# Patient Record
Sex: FEMALE | Race: WHITE | NOT HISPANIC OR LATINO | Employment: OTHER | URBAN - NONMETROPOLITAN AREA
[De-identification: names, ages, dates, MRNs, and addresses within clinical notes are randomized per-mention and may not be internally consistent; named-entity substitution may affect disease eponyms.]

---

## 2017-07-06 ENCOUNTER — OFFICE VISIT (OUTPATIENT)
Dept: CARDIAC SURGERY | Facility: CLINIC | Age: 62
End: 2017-07-06

## 2017-07-06 DIAGNOSIS — R91.1 LUNG NODULE: Primary | ICD-10-CM

## 2017-07-06 PROCEDURE — 99205 OFFICE O/P NEW HI 60 MIN: CPT | Performed by: THORACIC SURGERY (CARDIOTHORACIC VASCULAR SURGERY)

## 2017-07-07 VITALS
WEIGHT: 108 LBS | DIASTOLIC BLOOD PRESSURE: 72 MMHG | SYSTOLIC BLOOD PRESSURE: 164 MMHG | BODY MASS INDEX: 19.14 KG/M2 | HEIGHT: 63 IN

## 2017-07-07 RX ORDER — LEVOCETIRIZINE DIHYDROCHLORIDE 5 MG/1
5 TABLET, FILM COATED ORAL EVERY EVENING
COMMUNITY

## 2017-07-07 RX ORDER — PNEUMOCOCCAL 13-VALENT CONJUGATE VACCINE 2.2; 2.2; 2.2; 2.2; 2.2; 4.4; 2.2; 2.2; 2.2; 2.2; 2.2; 2.2; 2.2 UG/.5ML; UG/.5ML; UG/.5ML; UG/.5ML; UG/.5ML; UG/.5ML; UG/.5ML; UG/.5ML; UG/.5ML; UG/.5ML; UG/.5ML; UG/.5ML; UG/.5ML
INJECTION, SUSPENSION INTRAMUSCULAR
Refills: 0 | COMMUNITY
Start: 2017-05-03

## 2017-07-07 RX ORDER — ALBUTEROL SULFATE 2.5 MG/3ML
2.5 SOLUTION RESPIRATORY (INHALATION) EVERY 4 HOURS PRN
COMMUNITY

## 2017-07-07 RX ORDER — ASPIRIN 81 MG/1
81 TABLET, CHEWABLE ORAL DAILY
COMMUNITY

## 2017-07-07 RX ORDER — LEVETIRACETAM 500 MG/1
500 TABLET ORAL 2 TIMES DAILY
COMMUNITY

## 2017-07-07 RX ORDER — POTASSIUM CHLORIDE 750 MG/1
10 TABLET, EXTENDED RELEASE ORAL 2 TIMES DAILY
COMMUNITY

## 2017-07-07 RX ORDER — SIMVASTATIN 40 MG
40 TABLET ORAL NIGHTLY
COMMUNITY

## 2017-07-07 RX ORDER — FUROSEMIDE 20 MG/1
20 TABLET ORAL 2 TIMES DAILY
COMMUNITY

## 2017-07-07 NOTE — PROGRESS NOTES
07/06/2017  Patient Information  Temi Preston                                                                                          [address] [facility] [place] [address]   1955  'PCP/Referring Physician'  Gunnar Galavizrick Mullins, DO  [phone]  No ref. provider found    Chief Complaint   Patient presents with   • Consult     np per dr sindi mullins for lung mass       History of Present Illness:  Ms. Preston is a 61 year old female who is referred at this time for evaluation of a right apical mass.  This apparently has enlarged from 0.9 cm to 1.2 cm.  I have been asked to see the patient in regards to this.  She has been a heavy smoker and continues to smoke despite being on approximately 3L oxygen continually.  She has lost some weight.  She denies hemoptysis.  She does have a cough.    Patient Active Problem List   Diagnosis   • Lung nodule     Past Medical History:   Diagnosis Date   • COPD (chronic obstructive pulmonary disease)    • MI (myocardial infarction)    • UTI (urinary tract infection)      Past Surgical History:   Procedure Laterality Date   • CARPAL TUNNEL RELEASE     • EAR TUBES     • OVARIAN CYST REMOVAL     • SHOULDER LIGAMENT REPAIR         Current Outpatient Prescriptions:   •  albuterol (PROVENTIL) (2.5 MG/3ML) 0.083% nebulizer solution, Take 2.5 mg by nebulization Every 4 (Four) Hours As Needed for Wheezing., Disp: , Rfl:   •  aspirin 81 MG chewable tablet, Chew 81 mg Daily., Disp: , Rfl:   •  BOOSTRIX 5-2.5-18.5 injection, , Disp: , Rfl: 0  •  fluticasone-salmeterol (ADVAIR HFA) 230-21 MCG/ACT inhaler, Inhale 2 puffs 2 (Two) Times a Day., Disp: , Rfl:   •  furosemide (LASIX) 20 MG tablet, Take 20 mg by mouth 2 (Two) Times a Day., Disp: , Rfl:   •  levETIRAcetam (KEPPRA) 500 MG tablet, Take 500 mg by mouth 2 (Two) Times a Day., Disp: , Rfl:   •  levocetirizine (XYZAL) 5 MG tablet, Take 5 mg by mouth Every Evening., Disp: , Rfl:   •  potassium chloride (K-DUR,KLOR-CON) 10 MEQ CR tablet,  Take 10 mEq by mouth 2 (Two) Times a Day., Disp: , Rfl:   •  PREVNAR 13 vaccine, , Disp: , Rfl: 0  •  simvastatin (ZOCOR) 40 MG tablet, Take 40 mg by mouth Every Night., Disp: , Rfl:   •  tiotropium (SPIRIVA) 18 MCG per inhalation capsule, Place 1 capsule into inhaler and inhale Daily., Disp: , Rfl:   No Known Allergies  Social History     Social History   • Marital status: Unknown     Spouse name: N/A   • Number of children: N/A   • Years of education: N/A     Occupational History   • retired           Social History Main Topics   • Smoking status: Current Every Day Smoker     Packs/day: 0.50     Years: 53.00     Types: Cigarettes     Start date: 7/5/1964   • Smokeless tobacco: Never Used   • Alcohol use No      Comment: former drinker    • Drug use: No   • Sexual activity: Not on file     Other Topics Concern   • Not on file     Social History Narrative     Family History   Problem Relation Age of Onset   • Cancer Mother    • Heart attack Father      Review of Systems   Constitution: Positive for chills and malaise/fatigue. Negative for fever, night sweats and weight loss.   HENT: Negative for headaches, hearing loss, odynophagia and sore throat.    Cardiovascular: Positive for dyspnea on exertion. Negative for chest pain, leg swelling, orthopnea and palpitations.   Respiratory: Positive for cough and shortness of breath. Negative for hemoptysis.    Endocrine: Positive for heat intolerance. Negative for cold intolerance, polydipsia, polyphagia and polyuria.   Hematologic/Lymphatic: Bruises/bleeds easily.   Skin: Negative for itching and rash.   Musculoskeletal: Positive for joint swelling. Negative for joint pain and myalgias.   Gastrointestinal: Positive for abdominal pain and constipation. Negative for diarrhea, hematemesis, hematochezia, melena, nausea and vomiting.   Genitourinary: Positive for hesitancy and incomplete emptying. Negative for dysuria, frequency and hematuria.   Neurological: Positive  "for dizziness, light-headedness and numbness. Negative for focal weakness and seizures.   Psychiatric/Behavioral: Negative for suicidal ideas.   All other systems reviewed and are negative.    Vitals:    07/07/17 1038   BP: 164/72   BP Location: Right arm   Patient Position: Sitting   Weight: 108 lb (49 kg)   Height: 63\" (160 cm)      Physical Exam  GENERAL APPEARANCE: Oriented x3, well-nourished, well developed, in no acute distress.  EYES:  Eyes anicteric.  EOMI.  PERRLA.   ENT:             Good dentition.  NECK: Supple without masses or thyromegaly.  LUNGS:        Decreased in the bases in the bases.    CARDIOVASCULAR:  Regular rate and rhythm without murmur, rub or gallop.  There are no carotid bruits.  GI:  Abdomen is soft, nontender, nondistended with normal bowel sounds.  No hepatosplenomegaly and no masses.  EXTREMITIES:  No cyanosis, clubbing or edema.  SKIN:  No ulcerations, petechiae or bruising.  MUSCULOSKELETAL:  Full range of motion with no deformity of extremities.  NEURO: Cranial nerves II-XII, motor and sensory exams are intact.  PSYCH: Alert and oriented; mood and affect good.    Labs/Imaging:  I obtained and reviewed medical records from Dr. Mullins' office including the CTA chest which demonstrated a spiculated right apical nodule 1.2 cm in size.    Assessment/Plan:  Ms. Preston is a 61 year old female who has a concerning mass in the apex of the right lung measuring 1.2 cm.  I think that she certainly will be an ultra-high risk for a needle biopsy with her COPD and oxygen.  I think that a PET scan is indicated at this point as being her best option.  I have discussed this in detail and have answered all of her questions.  We will arrange for a PET scan to assess this nodule.    Patient Active Problem List   Diagnosis   • Lung nodule     Signed by: Pawan Edwards M.D.    7/6/2017    CC:  MD Rosa M Bella transcribing on behalf of Pawan Edwards MD dictating.        "

## 2017-07-10 PROBLEM — R91.1 LUNG NODULE: Status: ACTIVE | Noted: 2017-07-10

## 2017-07-14 DIAGNOSIS — R91.1 LUNG NODULE: Primary | ICD-10-CM

## 2017-08-03 ENCOUNTER — OFFICE VISIT (OUTPATIENT)
Dept: CARDIAC SURGERY | Facility: CLINIC | Age: 62
End: 2017-08-03

## 2017-08-03 DIAGNOSIS — R91.1 LUNG NODULE: Primary | ICD-10-CM

## 2017-08-03 PROCEDURE — 99212 OFFICE O/P EST SF 10 MIN: CPT | Performed by: THORACIC SURGERY (CARDIOTHORACIC VASCULAR SURGERY)

## 2017-08-04 VITALS
OXYGEN SATURATION: 89 % | HEART RATE: 89 BPM | SYSTOLIC BLOOD PRESSURE: 124 MMHG | BODY MASS INDEX: 18.77 KG/M2 | HEIGHT: 62 IN | DIASTOLIC BLOOD PRESSURE: 55 MMHG | WEIGHT: 102 LBS

## 2017-08-04 RX ORDER — BUPROPION HYDROCHLORIDE 150 MG/1
TABLET ORAL
COMMUNITY
Start: 2017-08-02

## 2017-08-04 RX ORDER — THEOPHYLLINE 300 MG/1
TABLET, EXTENDED RELEASE ORAL
COMMUNITY
Start: 2017-08-02

## 2017-08-04 NOTE — PROGRESS NOTES
08/03/2017  Patient Information  Temi Preston                                                                                          [address] [facility] [place] [address]   1955  'PCP/Referring Physician'  Gunnar Mullins, DO  [phone]  No ref. provider found    Chief Complaint   Patient presents with   • Lung Nodule     4 week follow-up with results from PET scan       History of Present Illness:  Ms. Preston returns today for follow up of her lung nodule and PET scan.  Her PET scan revealed a very low SUV of 1.4. This is essentially a negative PET scan.  I have discussed this with her.    Patient Active Problem List   Diagnosis   • Lung nodule     Past Medical History:   Diagnosis Date   • COPD (chronic obstructive pulmonary disease)    • MI (myocardial infarction)    • UTI (urinary tract infection)      Past Surgical History:   Procedure Laterality Date   • CARPAL TUNNEL RELEASE     • EAR TUBES     • OVARIAN CYST REMOVAL     • SHOULDER LIGAMENT REPAIR         Current Outpatient Prescriptions:   •  albuterol (PROVENTIL) (2.5 MG/3ML) 0.083% nebulizer solution, Take 2.5 mg by nebulization Every 4 (Four) Hours As Needed for Wheezing., Disp: , Rfl:   •  aspirin 81 MG chewable tablet, Chew 81 mg Daily., Disp: , Rfl:   •  BOOSTRIX 5-2.5-18.5 injection, , Disp: , Rfl: 0  •  buPROPion XL (WELLBUTRIN XL) 150 MG 24 hr tablet, , Disp: , Rfl:   •  fluticasone-salmeterol (ADVAIR HFA) 230-21 MCG/ACT inhaler, Inhale 2 puffs 2 (Two) Times a Day., Disp: , Rfl:   •  furosemide (LASIX) 20 MG tablet, Take 20 mg by mouth 2 (Two) Times a Day., Disp: , Rfl:   •  levETIRAcetam (KEPPRA) 500 MG tablet, Take 500 mg by mouth 2 (Two) Times a Day., Disp: , Rfl:   •  levocetirizine (XYZAL) 5 MG tablet, Take 5 mg by mouth Every Evening., Disp: , Rfl:   •  potassium chloride (K-DUR,KLOR-CON) 10 MEQ CR tablet, Take 10 mEq by mouth 2 (Two) Times a Day., Disp: , Rfl:   •  simvastatin (ZOCOR) 40 MG tablet, Take 40 mg by mouth Every  Night., Disp: , Rfl:   •  theophylline (THEODUR) 300 MG 12 hr tablet, , Disp: , Rfl:   •  tiotropium (SPIRIVA) 18 MCG per inhalation capsule, Place 1 capsule into inhaler and inhale Daily., Disp: , Rfl:   •  PREVNAR 13 vaccine, , Disp: , Rfl: 0  No Known Allergies  Social History     Social History   • Marital status: Unknown     Spouse name: N/A   • Number of children: N/A   • Years of education: N/A     Occupational History   • retired           Social History Main Topics   • Smoking status: Current Every Day Smoker     Packs/day: 0.50     Years: 53.00     Types: Cigarettes     Start date: 7/5/1964   • Smokeless tobacco: Never Used   • Alcohol use No      Comment: former drinker    • Drug use: No   • Sexual activity: Not on file     Other Topics Concern   • Not on file     Social History Narrative     Family History   Problem Relation Age of Onset   • Cancer Mother    • Heart attack Father      Review of Systems   Constitution: Negative for chills, fever, malaise/fatigue, night sweats and weight loss.   HENT: Negative for headaches, hearing loss, odynophagia and sore throat.    Cardiovascular: Negative for chest pain, dyspnea on exertion, leg swelling, orthopnea and palpitations.   Respiratory: Positive for shortness of breath. Negative for cough and hemoptysis.    Endocrine: Negative for cold intolerance, heat intolerance, polydipsia, polyphagia and polyuria.   Hematologic/Lymphatic: Does not bruise/bleed easily.   Skin: Negative for itching and rash.   Musculoskeletal: Negative for joint pain, joint swelling and myalgias.   Gastrointestinal: Negative for abdominal pain, constipation, diarrhea, hematemesis, hematochezia, melena, nausea and vomiting.   Genitourinary: Negative for dysuria, frequency and hematuria.   Neurological: Negative for focal weakness, numbness and seizures.   Psychiatric/Behavioral: Negative for suicidal ideas.   All other systems reviewed and are negative.    Vitals:    08/04/17  "1158   BP: 124/55   BP Location: Right arm   Patient Position: Sitting   Pulse: 89   SpO2: (!) 89%   Weight: 102 lb (46.3 kg)   Height: 62\" (157.5 cm)      Physical Exam  LUNGS:   Decreased in the bases.  CARDIOVASCULAR:  Regular rhythm and rate.    Assessment/Plan:  I have discussed with Ms. Preston that with her being on continuous oxygen that she would be an ultra-high risk surgical candidate. At this point, I think that the best thing to do would be to follow her back in four months.  In my opinion she is not a surgical candidate.  She understands that.  We will see her back in four months with a CT scan.       Patient Active Problem List   Diagnosis   • Lung nodule     Signed by: Pawan Edwards M.D.    8/3/2017    CC:  DO Rosa M Laboy transcribing on behalf of Pawan Edwards MD dictating.     "

## 2017-12-08 ENCOUNTER — TELEPHONE (OUTPATIENT)
Dept: CARDIAC SURGERY | Facility: CLINIC | Age: 62
End: 2017-12-08

## 2017-12-11 ENCOUNTER — TELEPHONE (OUTPATIENT)
Dept: CARDIAC SURGERY | Facility: CLINIC | Age: 62
End: 2017-12-11

## 2017-12-11 DIAGNOSIS — R91.1 LUNG NODULE: Primary | ICD-10-CM

## 2017-12-11 NOTE — TELEPHONE ENCOUNTER
PT RECEIVED RECALL LETTER PER DR HARMON IN [place]. PT VERIFIED PHONE NUMBER, ADDRESS, AND INSURANCE 12/11/2017

## 2018-02-15 ENCOUNTER — OFFICE VISIT (OUTPATIENT)
Dept: CARDIAC SURGERY | Facility: CLINIC | Age: 63
End: 2018-02-15

## 2018-02-15 DIAGNOSIS — J44.9 CHRONIC OBSTRUCTIVE PULMONARY DISEASE, UNSPECIFIED COPD TYPE (HCC): ICD-10-CM

## 2018-02-15 DIAGNOSIS — R91.1 LUNG NODULE: Primary | ICD-10-CM

## 2018-02-15 PROCEDURE — 99212 OFFICE O/P EST SF 10 MIN: CPT | Performed by: THORACIC SURGERY (CARDIOTHORACIC VASCULAR SURGERY)

## 2018-02-16 VITALS
SYSTOLIC BLOOD PRESSURE: 149 MMHG | BODY MASS INDEX: 18.61 KG/M2 | HEIGHT: 63 IN | HEART RATE: 115 BPM | DIASTOLIC BLOOD PRESSURE: 106 MMHG | WEIGHT: 105 LBS

## 2018-02-16 PROBLEM — J44.9 CHRONIC OBSTRUCTIVE PULMONARY DISEASE (HCC): Status: ACTIVE | Noted: 2018-02-16

## 2018-02-16 NOTE — PROGRESS NOTES
02/15/2018  Patient Information  Temi Preston                                                                                          [address] [facility] [place] [address]   1955  'PCP/Referring Physician'  Gunnar Mullins, DO  [phone]  No ref. provider found    Chief Complaint   Patient presents with   • Follow-up     4 MO FU with CT Chest for Lung Nodule       History of Present Illness:  Ms. Preston returns today for follow up of her lung nodule. She is not doing well today.  She is very short of breath.  She is still on her oxygen 24/7.    Patient Active Problem List   Diagnosis   • Lung nodule   • Chronic obstructive pulmonary disease     Past Medical History:   Diagnosis Date   • COPD (chronic obstructive pulmonary disease)    • Lung nodule    • MI (myocardial infarction)    • UTI (urinary tract infection)      Past Surgical History:   Procedure Laterality Date   • CARPAL TUNNEL RELEASE     • EAR TUBES     • OVARIAN CYST REMOVAL     • SHOULDER LIGAMENT REPAIR         Current Outpatient Prescriptions:   •  albuterol (PROVENTIL) (2.5 MG/3ML) 0.083% nebulizer solution, Take 2.5 mg by nebulization Every 4 (Four) Hours As Needed for Wheezing., Disp: , Rfl:   •  aspirin 81 MG chewable tablet, Chew 81 mg Daily., Disp: , Rfl:   •  BOOSTRIX 5-2.5-18.5 injection, , Disp: , Rfl: 0  •  buPROPion XL (WELLBUTRIN XL) 150 MG 24 hr tablet, , Disp: , Rfl:   •  fluticasone-salmeterol (ADVAIR HFA) 230-21 MCG/ACT inhaler, Inhale 2 puffs 2 (Two) Times a Day., Disp: , Rfl:   •  furosemide (LASIX) 20 MG tablet, Take 20 mg by mouth 2 (Two) Times a Day., Disp: , Rfl:   •  levETIRAcetam (KEPPRA) 500 MG tablet, Take 500 mg by mouth 2 (Two) Times a Day., Disp: , Rfl:   •  levocetirizine (XYZAL) 5 MG tablet, Take 5 mg by mouth Every Evening., Disp: , Rfl:   •  potassium chloride (K-DUR,KLOR-CON) 10 MEQ CR tablet, Take 10 mEq by mouth 2 (Two) Times a Day., Disp: , Rfl:   •  PREVNAR 13 vaccine, , Disp: , Rfl: 0  •  simvastatin  (ZOCOR) 40 MG tablet, Take 40 mg by mouth Every Night., Disp: , Rfl:   •  theophylline (THEODUR) 300 MG 12 hr tablet, , Disp: , Rfl:   •  tiotropium (SPIRIVA) 18 MCG per inhalation capsule, Place 1 capsule into inhaler and inhale Daily., Disp: , Rfl:   No Known Allergies  Social History     Social History   • Marital status: Unknown     Spouse name: N/A   • Number of children: 2   • Years of education: N/A     Occupational History   • retired           Social History Main Topics   • Smoking status: Current Some Day Smoker     Packs/day: 0.50     Years: 53.00     Types: Cigarettes     Start date: 7/5/1964   • Smokeless tobacco: Never Used   • Alcohol use No      Comment: former drinker    • Drug use: No   • Sexual activity: Not on file     Other Topics Concern   • Not on file     Social History Narrative    Lives in [facility] with nephew and daughter     Family History   Problem Relation Age of Onset   • Cancer Mother    • Heart attack Father      Review of Systems   Constitution: Negative for chills, fever, malaise/fatigue, night sweats and weight loss.   HENT: Negative for hearing loss, odynophagia and sore throat.    Cardiovascular: Positive for dyspnea on exertion. Negative for chest pain, leg swelling, orthopnea and palpitations.   Respiratory: Positive for cough and shortness of breath. Negative for hemoptysis.    Endocrine: Negative for cold intolerance, heat intolerance, polydipsia, polyphagia and polyuria.   Hematologic/Lymphatic: Does not bruise/bleed easily.   Skin: Negative for itching and rash.   Musculoskeletal: Positive for back pain. Negative for joint pain, joint swelling and myalgias.   Gastrointestinal: Positive for constipation. Negative for abdominal pain, diarrhea, hematemesis, hematochezia, melena, nausea and vomiting.   Genitourinary: Negative for dysuria, frequency and hematuria.   Neurological: Negative for focal weakness, headaches, numbness and seizures.  "  Psychiatric/Behavioral: Negative for suicidal ideas.   All other systems reviewed and are negative.    Vitals:    02/15/18 0924   BP: (!) 149/106   BP Location: Left arm   Patient Position: Sitting   Pulse: 115   Weight: 47.6 kg (105 lb)   Height: 160 cm (63\")      Physical Exam  LUNGS:  Decreased in the bases.  CARDIOVASCULAR: Regular rhythm and rate.    Assessment/Plan:  I had a long discussion with Ms. Preston that her CT scan from December actually shows the nodule to be stable and actual improvement in infiltrative changes in her lungs.  I am concerned about her today.  She looks very bad.  I have encouraged her to go to the emergency room to be seen or go see Dr. Mullins.  She understands this.  I will see her tentatively back in nine months with a chest CT.      Patient Active Problem List   Diagnosis   • Lung nodule   • Chronic obstructive pulmonary disease     Signed by: Pawan Edwards M.D.    2/15/2018    CC:  DO Rosa M Allred transcribing on behalf of Pawan Edwards MD dictating.   "

## 2018-10-11 ENCOUNTER — TELEPHONE (OUTPATIENT)
Dept: CARDIAC SURGERY | Facility: CLINIC | Age: 63
End: 2018-10-11

## 2018-10-11 NOTE — TELEPHONE ENCOUNTER
PT RECEIVED RECALL LETTER PER [facility] IN [place] FOR CT OF CHEST @ [facility], PT VERIFIED PHONE NUMBER, ADDRESS, AND INSURANCE

## 2018-10-12 DIAGNOSIS — R06.02 SHORTNESS OF BREATH: ICD-10-CM

## 2018-10-12 DIAGNOSIS — R91.1 LUNG NODULE: Primary | ICD-10-CM

## 2019-03-18 DIAGNOSIS — R06.02 SHORTNESS OF BREATH: ICD-10-CM

## 2019-03-18 DIAGNOSIS — R91.1 LUNG NODULE: ICD-10-CM

## 2019-04-18 ENCOUNTER — OFFICE VISIT (OUTPATIENT)
Dept: CARDIAC SURGERY | Facility: CLINIC | Age: 64
End: 2019-04-18

## 2019-04-18 DIAGNOSIS — R91.1 LUNG NODULE: Primary | ICD-10-CM

## 2019-04-18 PROCEDURE — 99212 OFFICE O/P EST SF 10 MIN: CPT | Performed by: THORACIC SURGERY (CARDIOTHORACIC VASCULAR SURGERY)

## 2019-04-19 VITALS
BODY MASS INDEX: 18.78 KG/M2 | HEART RATE: 70 BPM | WEIGHT: 106 LBS | HEIGHT: 63 IN | DIASTOLIC BLOOD PRESSURE: 40 MMHG | OXYGEN SATURATION: 83 % | SYSTOLIC BLOOD PRESSURE: 70 MMHG

## 2019-04-19 RX ORDER — DILTIAZEM HYDROCHLORIDE 180 MG/1
180 CAPSULE, COATED, EXTENDED RELEASE ORAL DAILY
COMMUNITY

## 2019-04-19 NOTE — PROGRESS NOTES
04/18/2019  Patient Information  Temi Preston                                                                                          [address] [facility] [place] [address]   1955  'PCP/Referring Physician'  Gunnar Mullins,   [phone]  No ref. provider found    Chief Complaint   Patient presents with   • Follow-up     9 MO FU with CT Chest for Lung Nodule       History of Present Illness:  Ms. Preston returns today for follow up on her lung nodule.  Since last visit, overall she has been doing reasonably well.  She has had no new health issues.  She has been treated for recent pneumonia that the CT scan revealed.  She has not smoked apparently since December.  She has been on oxygen 24/7.    Patient Active Problem List   Diagnosis   • Lung nodule   • Chronic obstructive pulmonary disease (CMS/HCC)     Past Medical History:   Diagnosis Date   • COPD (chronic obstructive pulmonary disease) (CMS/HCC)    • Lung nodule    • MI (myocardial infarction) (CMS/HCC)    • UTI (urinary tract infection)      Past Surgical History:   Procedure Laterality Date   • CARPAL TUNNEL RELEASE     • EAR TUBES     • OVARIAN CYST REMOVAL     • SHOULDER LIGAMENT REPAIR         Current Outpatient Medications:   •  aspirin 81 MG chewable tablet, Chew 81 mg Daily., Disp: , Rfl:   •  buPROPion XL (WELLBUTRIN XL) 150 MG 24 hr tablet, , Disp: , Rfl:   •  diltiaZEM CD (CARDIZEM CD) 180 MG 24 hr capsule, Take 180 mg by mouth Daily., Disp: , Rfl:   •  metoprolol tartrate (LOPRESSOR) 25 MG tablet, Daily., Disp: , Rfl:   •  potassium chloride (K-DUR,KLOR-CON) 10 MEQ CR tablet, Take 10 mEq by mouth 2 (Two) Times a Day., Disp: , Rfl:   •  albuterol (PROVENTIL) (2.5 MG/3ML) 0.083% nebulizer solution, Take 2.5 mg by nebulization Every 4 (Four) Hours As Needed for Wheezing., Disp: , Rfl:   •  BOOSTRIX 5-2.5-18.5 injection, , Disp: , Rfl: 0  •  fluticasone-salmeterol (ADVAIR HFA) 230-21 MCG/ACT inhaler, Inhale 2 puffs 2 (Two) Times a Day.,  Disp: , Rfl:   •  furosemide (LASIX) 20 MG tablet, Take 20 mg by mouth 2 (Two) Times a Day., Disp: , Rfl:   •  levETIRAcetam (KEPPRA) 500 MG tablet, Take 500 mg by mouth 2 (Two) Times a Day., Disp: , Rfl:   •  levocetirizine (XYZAL) 5 MG tablet, Take 5 mg by mouth Every Evening., Disp: , Rfl:   •  PREVNAR 13 vaccine, , Disp: , Rfl: 0  •  simvastatin (ZOCOR) 40 MG tablet, Take 40 mg by mouth Every Night., Disp: , Rfl:   •  theophylline (THEODUR) 300 MG 12 hr tablet, , Disp: , Rfl:   •  tiotropium (SPIRIVA) 18 MCG per inhalation capsule, Place 1 capsule into inhaler and inhale Daily., Disp: , Rfl:   No Known Allergies  Social History     Socioeconomic History   • Marital status: Unknown     Spouse name: Not on file   • Number of children: 2   • Years of education: Not on file   • Highest education level: Not on file   Occupational History   • Occupation: retired     Comment:    Tobacco Use   • Smoking status: Former Smoker     Packs/day: 0.50     Years: 53.00     Pack years: 26.50     Types: Cigarettes     Start date: 1964     Last attempt to quit: 2018     Years since quittin.3   • Smokeless tobacco: Never Used   Substance and Sexual Activity   • Alcohol use: No     Comment: former drinker    • Drug use: No   Social History Narrative    Lives in [facility] with nephew and daughter     Family History   Problem Relation Age of Onset   • Cancer Mother    • Heart attack Father      Review of Systems   Constitution: Negative for chills, fever, malaise/fatigue, night sweats and weight loss.   HENT: Negative for hearing loss, odynophagia and sore throat.    Cardiovascular: Positive for dyspnea on exertion and leg swelling. Negative for chest pain, orthopnea and palpitations.   Respiratory: Positive for cough. Negative for hemoptysis.    Endocrine: Negative for cold intolerance, heat intolerance, polydipsia, polyphagia and polyuria.   Hematologic/Lymphatic: Bruises/bleeds easily.   Skin: Negative for  "itching and rash.   Musculoskeletal: Positive for joint pain. Negative for joint swelling and myalgias.   Gastrointestinal: Positive for constipation. Negative for abdominal pain, diarrhea, hematemesis, hematochezia, melena, nausea and vomiting.   Genitourinary: Negative for dysuria, frequency and hematuria.   Neurological: Positive for numbness and seizures. Negative for focal weakness and headaches.   Psychiatric/Behavioral: Negative for suicidal ideas.   All other systems reviewed and are negative.    Vitals:    04/18/19 1107   BP: (!) 70/40   BP Location: Left arm   Patient Position: Sitting   Pulse: 70   SpO2: (!) 83%   Weight: 48.1 kg (106 lb)   Height: 160 cm (63\")      Physical Exam  LUNGS:   Decreased in the bases with rales and rhonchi bilaterally.  CARDIOVASCULAR:  Regular rhythm and rate.    Labs/Imaging:  I have obtained and reviewed her CT scan.  The pulmonary nodule in the right upper lobe appears to be stable with no change.  She does have pneumonia, which is being treated by Dr. Mullins.    Assessment/Plan:  I will tentatively see her back in one year with a CT scan.     Patient Active Problem List   Diagnosis   • Lung nodule   • Chronic obstructive pulmonary disease (CMS/HCC)     CC: Gunnar Mullins, DO Rosa M Marquis transcribing on behalf of Pawan Edwards MD dictating.   "

## 2020-01-01 ENCOUNTER — TELEPHONE (OUTPATIENT)
Dept: CARDIAC SURGERY | Facility: CLINIC | Age: 65
End: 2020-01-01

## 2020-01-01 DIAGNOSIS — R91.1 LUNG NODULE: Primary | ICD-10-CM
